# Patient Record
Sex: MALE | Race: OTHER | NOT HISPANIC OR LATINO | URBAN - METROPOLITAN AREA
[De-identification: names, ages, dates, MRNs, and addresses within clinical notes are randomized per-mention and may not be internally consistent; named-entity substitution may affect disease eponyms.]

---

## 2022-02-28 ENCOUNTER — INPATIENT (INPATIENT)
Facility: HOSPITAL | Age: 57
LOS: 1 days | Discharge: HOME | DRG: 247 | End: 2022-03-02
Attending: HOSPITALIST | Admitting: HOSPITALIST
Payer: MEDICAID

## 2022-02-28 VITALS
OXYGEN SATURATION: 98 % | SYSTOLIC BLOOD PRESSURE: 157 MMHG | WEIGHT: 253.53 LBS | DIASTOLIC BLOOD PRESSURE: 89 MMHG | HEART RATE: 88 BPM | HEIGHT: 72 IN | RESPIRATION RATE: 18 BRPM | TEMPERATURE: 98 F

## 2022-02-28 DIAGNOSIS — I21.4 NON-ST ELEVATION (NSTEMI) MYOCARDIAL INFARCTION: ICD-10-CM

## 2022-02-28 LAB
ALBUMIN SERPL ELPH-MCNC: 4.6 G/DL — SIGNIFICANT CHANGE UP (ref 3.3–5)
ALP SERPL-CCNC: 104 U/L — SIGNIFICANT CHANGE UP (ref 40–120)
ALT FLD-CCNC: 29 U/L — SIGNIFICANT CHANGE UP (ref 10–45)
ANION GAP SERPL CALC-SCNC: 14 MMOL/L — SIGNIFICANT CHANGE UP (ref 5–17)
APTT BLD: 34.4 SEC — SIGNIFICANT CHANGE UP (ref 27.5–35.5)
AST SERPL-CCNC: 51 U/L — HIGH (ref 10–40)
BASOPHILS # BLD AUTO: 0.06 K/UL — SIGNIFICANT CHANGE UP (ref 0–0.2)
BASOPHILS NFR BLD AUTO: 0.6 % — SIGNIFICANT CHANGE UP (ref 0–2)
BILIRUB SERPL-MCNC: 0.4 MG/DL — SIGNIFICANT CHANGE UP (ref 0.2–1.2)
BUN SERPL-MCNC: 17 MG/DL — SIGNIFICANT CHANGE UP (ref 7–23)
CALCIUM SERPL-MCNC: 10.2 MG/DL — SIGNIFICANT CHANGE UP (ref 8.4–10.5)
CHLORIDE SERPL-SCNC: 101 MMOL/L — SIGNIFICANT CHANGE UP (ref 96–108)
CO2 SERPL-SCNC: 24 MMOL/L — SIGNIFICANT CHANGE UP (ref 22–31)
CREAT SERPL-MCNC: 0.97 MG/DL — SIGNIFICANT CHANGE UP (ref 0.5–1.3)
EGFR: 91 ML/MIN/1.73M2 — SIGNIFICANT CHANGE UP
EOSINOPHIL # BLD AUTO: 0.33 K/UL — SIGNIFICANT CHANGE UP (ref 0–0.5)
EOSINOPHIL NFR BLD AUTO: 3.3 % — SIGNIFICANT CHANGE UP (ref 0–6)
GLUCOSE SERPL-MCNC: 109 MG/DL — HIGH (ref 70–99)
HCT VFR BLD CALC: 47 % — SIGNIFICANT CHANGE UP (ref 39–50)
HGB BLD-MCNC: 15.5 G/DL — SIGNIFICANT CHANGE UP (ref 13–17)
IMM GRANULOCYTES NFR BLD AUTO: 0.2 % — SIGNIFICANT CHANGE UP (ref 0–1.5)
INR BLD: 1 RATIO — SIGNIFICANT CHANGE UP (ref 0.88–1.16)
LYMPHOCYTES # BLD AUTO: 2.98 K/UL — SIGNIFICANT CHANGE UP (ref 1–3.3)
LYMPHOCYTES # BLD AUTO: 29.6 % — SIGNIFICANT CHANGE UP (ref 13–44)
MCHC RBC-ENTMCNC: 28.3 PG — SIGNIFICANT CHANGE UP (ref 27–34)
MCHC RBC-ENTMCNC: 33 GM/DL — SIGNIFICANT CHANGE UP (ref 32–36)
MCV RBC AUTO: 85.8 FL — SIGNIFICANT CHANGE UP (ref 80–100)
MONOCYTES # BLD AUTO: 0.75 K/UL — SIGNIFICANT CHANGE UP (ref 0–0.9)
MONOCYTES NFR BLD AUTO: 7.4 % — SIGNIFICANT CHANGE UP (ref 2–14)
NEUTROPHILS # BLD AUTO: 5.93 K/UL — SIGNIFICANT CHANGE UP (ref 1.8–7.4)
NEUTROPHILS NFR BLD AUTO: 58.9 % — SIGNIFICANT CHANGE UP (ref 43–77)
NRBC # BLD: 0 /100 WBCS — SIGNIFICANT CHANGE UP (ref 0–0)
PLATELET # BLD AUTO: 254 K/UL — SIGNIFICANT CHANGE UP (ref 150–400)
POTASSIUM SERPL-MCNC: 4.3 MMOL/L — SIGNIFICANT CHANGE UP (ref 3.5–5.3)
POTASSIUM SERPL-SCNC: 4.3 MMOL/L — SIGNIFICANT CHANGE UP (ref 3.5–5.3)
PROT SERPL-MCNC: 7.8 G/DL — SIGNIFICANT CHANGE UP (ref 6–8.3)
PROTHROM AB SERPL-ACNC: 12 SEC — SIGNIFICANT CHANGE UP (ref 10.5–13.4)
RBC # BLD: 5.48 M/UL — SIGNIFICANT CHANGE UP (ref 4.2–5.8)
RBC # FLD: 13.8 % — SIGNIFICANT CHANGE UP (ref 10.3–14.5)
SARS-COV-2 RNA SPEC QL NAA+PROBE: SIGNIFICANT CHANGE UP
SODIUM SERPL-SCNC: 139 MMOL/L — SIGNIFICANT CHANGE UP (ref 135–145)
TROPONIN T, HIGH SENSITIVITY RESULT: 693 NG/L — HIGH (ref 0–51)
WBC # BLD: 10.07 K/UL — SIGNIFICANT CHANGE UP (ref 3.8–10.5)
WBC # FLD AUTO: 10.07 K/UL — SIGNIFICANT CHANGE UP (ref 3.8–10.5)

## 2022-02-28 PROCEDURE — 93010 ELECTROCARDIOGRAM REPORT: CPT

## 2022-02-28 PROCEDURE — 93308 TTE F-UP OR LMTD: CPT | Mod: 26

## 2022-02-28 PROCEDURE — 71045 X-RAY EXAM CHEST 1 VIEW: CPT | Mod: 26

## 2022-02-28 PROCEDURE — 99291 CRITICAL CARE FIRST HOUR: CPT

## 2022-02-28 PROCEDURE — 99223 1ST HOSP IP/OBS HIGH 75: CPT

## 2022-02-28 RX ORDER — TICAGRELOR 90 MG/1
180 TABLET ORAL ONCE
Refills: 0 | Status: COMPLETED | OUTPATIENT
Start: 2022-02-28 | End: 2022-02-28

## 2022-02-28 RX ORDER — HEPARIN SODIUM 5000 [USP'U]/ML
INJECTION INTRAVENOUS; SUBCUTANEOUS
Qty: 25000 | Refills: 0 | Status: DISCONTINUED | OUTPATIENT
Start: 2022-02-28 | End: 2022-03-01

## 2022-02-28 RX ORDER — NITROGLYCERIN 6.5 MG
0.4 CAPSULE, EXTENDED RELEASE ORAL ONCE
Refills: 0 | Status: COMPLETED | OUTPATIENT
Start: 2022-02-28 | End: 2022-02-28

## 2022-02-28 RX ORDER — HEPARIN SODIUM 5000 [USP'U]/ML
6000 INJECTION INTRAVENOUS; SUBCUTANEOUS EVERY 6 HOURS
Refills: 0 | Status: DISCONTINUED | OUTPATIENT
Start: 2022-02-28 | End: 2022-03-01

## 2022-02-28 RX ORDER — HEPARIN SODIUM 5000 [USP'U]/ML
5000 INJECTION INTRAVENOUS; SUBCUTANEOUS ONCE
Refills: 0 | Status: COMPLETED | OUTPATIENT
Start: 2022-02-28 | End: 2022-02-28

## 2022-02-28 RX ORDER — ACETAMINOPHEN 500 MG
975 TABLET ORAL ONCE
Refills: 0 | Status: COMPLETED | OUTPATIENT
Start: 2022-02-28 | End: 2022-02-28

## 2022-02-28 RX ORDER — ASPIRIN/CALCIUM CARB/MAGNESIUM 324 MG
324 TABLET ORAL ONCE
Refills: 0 | Status: COMPLETED | OUTPATIENT
Start: 2022-02-28 | End: 2022-02-28

## 2022-02-28 RX ORDER — ATORVASTATIN CALCIUM 80 MG/1
80 TABLET, FILM COATED ORAL ONCE
Refills: 0 | Status: COMPLETED | OUTPATIENT
Start: 2022-02-28 | End: 2022-02-28

## 2022-02-28 RX ADMIN — Medication 975 MILLIGRAM(S): at 22:20

## 2022-02-28 RX ADMIN — HEPARIN SODIUM 5000 UNIT(S): 5000 INJECTION INTRAVENOUS; SUBCUTANEOUS at 23:23

## 2022-02-28 RX ADMIN — Medication 324 MILLIGRAM(S): at 22:20

## 2022-02-28 RX ADMIN — HEPARIN SODIUM 1000 UNIT(S)/HR: 5000 INJECTION INTRAVENOUS; SUBCUTANEOUS at 23:24

## 2022-02-28 RX ADMIN — TICAGRELOR 180 MILLIGRAM(S): 90 TABLET ORAL at 23:26

## 2022-02-28 RX ADMIN — Medication 0.4 MILLIGRAM(S): at 22:30

## 2022-02-28 NOTE — ED ADULT NURSE REASSESSMENT NOTE - NS ED NURSE REASSESS COMMENT FT1
Pt denies any CP relief after receiving PO tylenol and nitro SL per MD's order. RN made provider aware. VSS on RA. Pt on tele monitor. NAD. Will continue to closely monitor pt.

## 2022-02-28 NOTE — ED PROVIDER NOTE - PHYSICAL EXAMINATION
GENERAL: well appearing in no acute distress, non-toxic appearing  HEAD: normocephalic, atraumatic  HEENT: normal conjunctiva, oral mucosa moist, uvula midline, no tonsilar exudates, neck supple, no JVD  CARDIAC: regular rate and rhythm, normal S1S2, no appreciable murmurs, 2+ pulses in UE/LE b/l. Pain is reproducible w/ palpation.   PULM: normal breath sounds, clear to ascultation bilaterally, no rales, rhonchi, wheezing  GI: abdomen nondistended, soft, nontender, no guarding, rebound tenderness  : no CVA tenderness b/l, no suprapubic tenderness  NEURO: no focal motor or sensory deficits, CN2-12 intact, normal speech, PERRLA, EOMI, AAOx3  MSK: no peripheral edema, no calf tenderness b/l  SKIN: well-perfused, extremities warm, no visible rashes  PSYCH: appropriate mood and affect

## 2022-02-28 NOTE — ED ADULT NURSE NOTE - OBJECTIVE STATEMENT
56y Male PMHx appendectomy, primarily Swedish speaking but can make needs met in English, son at bedside for translation, presenting to ED for L sided CP radiating to L shoulder that began 2 days ago while at work. 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 Pt denies fever/chills/SOB/dyspnea. IV placed, labs drawn and sent, seen and eval by MD.

## 2022-02-28 NOTE — ED PROVIDER NOTE - OBJECTIVE STATEMENT
55 y/o male w/ PSH appendectomy w/o PMH c/o 2 day history left-side chest pain w/ radiation to left shoulder that began at work. Denies fevers, chills, numbness/tingling, dizziness, SOB, abdominal pain, dysuria, hematuria.

## 2022-02-28 NOTE — ED PROVIDER NOTE - ATTENDING CONTRIBUTION TO CARE
Attending MD Dumont:  I personally have seen and examined this patient. I have performed a substantive portion of the visit including all aspects of the medical decision making.  Resident note reviewed and agree on plan of care and except where noted.  See HPI, PE, and MDM for details.      55 y/o male, daily smoker, w/ PSH appendectomy w/o PMH c/o 2 day history left-side chest pain w/ radiation to left shoulder that began at work. Pain is constant but has spikes in intensity, left anterior with radiation to the left shoulder. No associated n/v or diaphoresis. Pain 8/10 on my interview. Patient states pain is slightly better with tylenol, worse with lying flat.         T(C): 36.8 (28 Feb 2022 18:25), Max: 36.8 (28 Feb 2022 18:25)  T(F): 98.2 (28 Feb 2022 18:25), Max: 98.2 (28 Feb 2022 18:25)  HR: 88 (28 Feb 2022 18:25) (88 - 88)  BP: 157/89 (28 Feb 2022 18:25) (157/89 - 157/89)  RR: 18 (28 Feb 2022 18:25) (18 - 18)  SpO2: 98% (28 Feb 2022 18:25) (98% - 98%)    Attending MD Dumont:    Gen:  No apparent distress, conversational  Neck: supple, no meningimus  CV: heart with reg rhythm, no obvious murmur appreciated. radial pulses 2+ bilaterally, upper and lower extremities warm to the touch. +ttp left anterior chest wall, no skin changes    Resp: clear to auscultation anteriorly bilaterally, breathing comfortably  Abd: soft, NT, ND  Extremities: ankles warm to the touch, no appreciable ankle edema bilaterally  Pysch: appropriate affect    Neuro: moves all extremities spontaneously         55 y/o male, daily smoker, w/ PSH appendectomy w/o PMH c/o 2 day history left-side chest pain w/ radiation to left shoulder that began at work. ECG on arrival with subtle inferior ST depression, question of very slight LEILANI aVL, though not quite meeting STEMI criteria. Repeat ECG in main ED with stable findings. Story is atypical for ACS, however ECG is concerning for possible occlusion MI. Plan for aspirin load, SL nitro, serial ECGs, send biomarkers. Do not suspect PE or dissection       *The above represents an initial assessment/impression. Please refer to progress notes for potential changes in patient clinical course*

## 2022-02-28 NOTE — ED PROVIDER NOTE - CARE PLAN
Principal Discharge DX:	Chest pain   1 Principal Discharge DX:	NSTEMI (non-ST elevation myocardial infarction)

## 2022-02-28 NOTE — ED PROVIDER NOTE - NS ED MD EM SELECTION
Medication would not load.    [facility]  Requesting a refill on Cholestyramine powder.   [address] Critical Care - 30 to 74 minutes

## 2022-02-28 NOTE — ED PROVIDER NOTE - CLINICAL SUMMARY MEDICAL DECISION MAKING FREE TEXT BOX
57 y/o male w/ PSH appendectomy w/o PMH c/o 2 day history left-side chest pain w/ radiation to left shoulder that began at work. Concerning for ACS vs pericarditis vs costocondritis.

## 2022-02-28 NOTE — ED PROVIDER NOTE - ST/T WAVE
stable appearing inferior ST depression, LEILANI aVL does not seem to be present anymore, TWI aVL present subtle ST depression lead II, III, aVF, 0.5mm LEILANI aVL with associated T wave inversion

## 2022-03-01 ENCOUNTER — TRANSCRIPTION ENCOUNTER (OUTPATIENT)
Age: 57
End: 2022-03-01

## 2022-03-01 DIAGNOSIS — I21.4 NON-ST ELEVATION (NSTEMI) MYOCARDIAL INFARCTION: ICD-10-CM

## 2022-03-01 DIAGNOSIS — Z29.9 ENCOUNTER FOR PROPHYLACTIC MEASURES, UNSPECIFIED: ICD-10-CM

## 2022-03-01 LAB
APTT BLD: 56.3 SEC — HIGH (ref 27.5–35.5)
CK MB CFR SERPL CALC: 20.1 NG/ML — HIGH (ref 0–6.7)
HCT VFR BLD CALC: 43.4 % — SIGNIFICANT CHANGE UP (ref 39–50)
HGB BLD-MCNC: 14.1 G/DL — SIGNIFICANT CHANGE UP (ref 13–17)
MCHC RBC-ENTMCNC: 27.9 PG — SIGNIFICANT CHANGE UP (ref 27–34)
MCHC RBC-ENTMCNC: 32.5 GM/DL — SIGNIFICANT CHANGE UP (ref 32–36)
MCV RBC AUTO: 85.9 FL — SIGNIFICANT CHANGE UP (ref 80–100)
NRBC # BLD: 0 /100 WBCS — SIGNIFICANT CHANGE UP (ref 0–0)
PLATELET # BLD AUTO: 242 K/UL — SIGNIFICANT CHANGE UP (ref 150–400)
RBC # BLD: 5.05 M/UL — SIGNIFICANT CHANGE UP (ref 4.2–5.8)
RBC # FLD: 13.7 % — SIGNIFICANT CHANGE UP (ref 10.3–14.5)
TROPONIN T, HIGH SENSITIVITY RESULT: 743 NG/L — HIGH (ref 0–51)
WBC # BLD: 8.7 K/UL — SIGNIFICANT CHANGE UP (ref 3.8–10.5)
WBC # FLD AUTO: 8.7 K/UL — SIGNIFICANT CHANGE UP (ref 3.8–10.5)

## 2022-03-01 PROCEDURE — 92928 PRQ TCAT PLMT NTRAC ST 1 LES: CPT | Mod: LD

## 2022-03-01 PROCEDURE — 99223 1ST HOSP IP/OBS HIGH 75: CPT

## 2022-03-01 PROCEDURE — 92929: CPT | Mod: LD

## 2022-03-01 PROCEDURE — 99233 SBSQ HOSP IP/OBS HIGH 50: CPT

## 2022-03-01 PROCEDURE — 99152 MOD SED SAME PHYS/QHP 5/>YRS: CPT

## 2022-03-01 PROCEDURE — 93458 L HRT ARTERY/VENTRICLE ANGIO: CPT | Mod: 26,59

## 2022-03-01 PROCEDURE — 93010 ELECTROCARDIOGRAM REPORT: CPT | Mod: 76

## 2022-03-01 PROCEDURE — 93306 TTE W/DOPPLER COMPLETE: CPT | Mod: 26

## 2022-03-01 RX ORDER — HEPARIN SODIUM 5000 [USP'U]/ML
INJECTION INTRAVENOUS; SUBCUTANEOUS
Qty: 25000 | Refills: 0 | Status: DISCONTINUED | OUTPATIENT
Start: 2022-03-01 | End: 2022-03-01

## 2022-03-01 RX ORDER — NITROGLYCERIN 6.5 MG
1 CAPSULE, EXTENDED RELEASE ORAL EVERY 8 HOURS
Refills: 0 | Status: DISCONTINUED | OUTPATIENT
Start: 2022-03-01 | End: 2022-03-02

## 2022-03-01 RX ORDER — NICOTINE POLACRILEX 2 MG
1 GUM BUCCAL DAILY
Refills: 0 | Status: DISCONTINUED | OUTPATIENT
Start: 2022-03-01 | End: 2022-03-02

## 2022-03-01 RX ORDER — FENTANYL CITRATE 50 UG/ML
25 INJECTION INTRAVENOUS ONCE
Refills: 0 | Status: DISCONTINUED | OUTPATIENT
Start: 2022-03-01 | End: 2022-03-01

## 2022-03-01 RX ORDER — ASPIRIN/CALCIUM CARB/MAGNESIUM 324 MG
325 TABLET ORAL DAILY
Refills: 0 | Status: DISCONTINUED | OUTPATIENT
Start: 2022-03-01 | End: 2022-03-01

## 2022-03-01 RX ORDER — ASPIRIN/CALCIUM CARB/MAGNESIUM 324 MG
81 TABLET ORAL DAILY
Refills: 0 | Status: DISCONTINUED | OUTPATIENT
Start: 2022-03-01 | End: 2022-03-02

## 2022-03-01 RX ORDER — TICAGRELOR 90 MG/1
90 TABLET ORAL EVERY 12 HOURS
Refills: 0 | Status: DISCONTINUED | OUTPATIENT
Start: 2022-03-01 | End: 2022-03-02

## 2022-03-01 RX ADMIN — ATORVASTATIN CALCIUM 80 MILLIGRAM(S): 80 TABLET, FILM COATED ORAL at 01:39

## 2022-03-01 RX ADMIN — Medication 1 INCH(S): at 21:10

## 2022-03-01 RX ADMIN — FENTANYL CITRATE 25 MICROGRAM(S): 50 INJECTION INTRAVENOUS at 17:36

## 2022-03-01 RX ADMIN — FENTANYL CITRATE 25 MICROGRAM(S): 50 INJECTION INTRAVENOUS at 16:44

## 2022-03-01 RX ADMIN — Medication 1 INCH(S): at 16:44

## 2022-03-01 RX ADMIN — TICAGRELOR 90 MILLIGRAM(S): 90 TABLET ORAL at 17:38

## 2022-03-01 RX ADMIN — HEPARIN SODIUM 1000 UNIT(S)/HR: 5000 INJECTION INTRAVENOUS; SUBCUTANEOUS at 06:37

## 2022-03-01 RX ADMIN — FENTANYL CITRATE 25 MICROGRAM(S): 50 INJECTION INTRAVENOUS at 16:52

## 2022-03-01 NOTE — ED ADULT NURSE REASSESSMENT NOTE - NS ED NURSE REASSESS COMMENT FT1
Pt asleep in stretcher but arouses to verbal stimuli. Pt a&ox4, spontaneous respirations and equal chest rise. VSS on RA. Tele monitoring continued. Pt offers no complaints. Will continue to closely monitor pt.

## 2022-03-01 NOTE — H&P ADULT - NSHPREVIEWOFSYSTEMS_GEN_ALL_CORE
CONSTITUTIONAL: No weakness, fevers or chills; no weight loss or weight gain  EYES: No visual changes; No blurry vision  ENT: No vertigo or throat pain   NECK: No pain or stiffness  RESPIRATORY: No cough, wheezing, hemoptysis; No shortness of breath  CARDIOVASCULAR: + chest pain , no palpitations, no RAE, no orthopnea or PND  GASTROINTESTINAL: No abdominal or epigastric pain. No nausea, vomiting, or hematemesis; No diarrhea or constipation. No melena or hematochezia.  GENITOURINARY: No dysuria, frequency or hematuria  NEUROLOGICAL: No numbness or weakness, no syncope  SKIN: No itching, rashes  PSYCH: No insomnia, no depression  IMMUNOLOGY: No gum bleeding, no lymphadenopathy  ENDOCRINE: No polydipsia, no heat or cold intolerance  RHEUM: No joint pain or swelling, no rash

## 2022-03-01 NOTE — CONSULT NOTE ADULT - SUBJECTIVE AND OBJECTIVE BOX
Patient seen and evaluated at bedside    Chief Complaint: CP    HPI:  56M no known PMH who presents with chest pain. CP started yesterday around 11am while moving boxes at work. Has been persistent, left sided, no radiation, no associated symptoms. Never had this sort of CP before. Patient is active smoker. Denies fever, SOB, abd pain, nausea, diaphoresis.     In the ED, /72, HR 70s. Exam unremarkable. Trop 693->743, CKMB 33->29. CXR clear. ECG with SR, no acute ischemic changes, no q waves. POCUS with extremely poor windows. When seen, patient was CP free after sublingual nitro.     PMHx:   Tobacco use    PSHx:   None    Allergies:  No Known Allergies      Home Meds: As per admission medication reconcilliation.     Current Medications:   atorvastatin 80 milliGRAM(s) Oral once  heparin   Injectable 6000 Unit(s) IV Push every 6 hours PRN  heparin  Infusion.  Unit(s)/Hr IV Continuous <Continuous>      FAMILY HISTORY: No early CVD.       Social History: Active smoking (1/2 pack for decades). No etoh.     REVIEW OF SYSTEMS:  Constitutional:     [x ] negative [ ] fevers [ ] chills [ ] weight loss [ ] weight gain  HEENT:                  [x ] negative [ ] dry eyes [ ] eye irritation [ ] postnasal drip [ ] nasal congestion  CV:                         [ x] negative  [ ] chest pain [ ] orthopnea [ ] palpitations [ ] murmur  Resp:                     [x ] negative [ ] cough [ ] shortness of breath [ ] dyspnea [ ] wheezing [ ] sputum [ ]hemoptysis  GI:                          [ x] negative [ ] nausea [ ] vomiting [ ] diarrhea [ ] constipation [ ] abd pain [ ] dysphagia   :                        [ x] negative [ ] dysuria [ ] nocturia [ ] hematuria [ ] increased urinary frequency  Musculoskeletal: [x ] negative [ ] back pain [ ] myalgias [ ] arthralgias [ ] fracture  Skin:                       [ x] negative [ ] rash [ ] itch  Neurological:        [ x] negative [ ] headache [ ] dizziness [ ] syncope [ ] weakness [ ] numbness  Psychiatric:           [ x] negative [ ] anxiety [ ] depression  Endocrine:            [ x] negative [ ] diabetes [ ] thyroid problem  Heme/Lymph:      [ x] negative [ ] anemia [ ] bleeding problem  Allergic/Immune: [ x] negative [ ] itchy eyes [ ] nasal discharge [ ] hives [ ] angioedema    [ x] All other systems negative  [ ] Unable to assess ROS due to      Physical Exam:  T(F): 98.2 (02-28), Max: 98.2 (02-28)  HR: 69 (02-28) (69 - 88)  BP: 112/70 (02-28) (112/70 - 157/89)  RR: 16 (02-28)  SpO2: 98% (02-28)  General: Alert, no acute distress, appears comfortable   HEENT: No scleral icterus, EOMI, no facial dysmorphia, no external ear lesions   Cardiac: Regular rate and rhythm, no murmurs, no rubs, no gallops   Pulmonary: Clear breath sounds throughout, no wheezing, no stridor, no crackles   Abdomen: Nondistended, nontender, appears soft   Skin: no obvious rash or lesions   Extremities: no LE edema  Neurological: Moving all 4 extremities, no overt focal deficits noted   Psych: normal mood and affect     Cardiovascular Diagnostic Testing:    ECG: Personally reviewed:  SR. No q waves.     Echo: Personally reviewed:  Pending.     Stress Testing:    Cath:    Imaging:    CXR: Personally reviewed    Labs: Personally reviewed                        15.5   10.07 )-----------( 254      ( 28 Feb 2022 22:16 )             47.0     02-28    139  |  101  |  17  ----------------------------<  109<H>  4.3   |  24  |  0.97    Ca    10.2      28 Feb 2022 22:16    TPro  7.8  /  Alb  4.6  /  TBili  0.4  /  DBili  x   /  AST  51<H>  /  ALT  29  /  AlkPhos  104  02-28    PT/INR - ( 28 Feb 2022 22:16 )   PT: 12.0 sec;   INR: 1.00 ratio         PTT - ( 28 Feb 2022 22:16 )  PTT:34.4 sec

## 2022-03-01 NOTE — H&P ADULT - NSHPADDITIONALINFOADULT_GEN_ALL_CORE
[facility] Division of Hospital Medicine  Beatriz Locke MD  Pager (M-F, 8A-5P): [phone]  Other Times:  [phone]

## 2022-03-01 NOTE — ASU PATIENT PROFILE, ADULT - FALL HARM RISK - UNIVERSAL INTERVENTIONS
Bed in lowest position, wheels locked, appropriate side rails in place/Call bell, personal items and telephone in reach/Instruct patient to call for assistance before getting out of bed or chair/Non-slip footwear when patient is out of bed/[place] to call system/Physically safe environment - no spills, clutter or unnecessary equipment/Purposeful Proactive Rounding/Room/bathroom lighting operational, light cord in reach

## 2022-03-01 NOTE — H&P ADULT - ASSESSMENT
56M no known PMH who presents with chest pain, found to have elevated trops, admitted for NSTEMI, pending cath.

## 2022-03-01 NOTE — CONSULT NOTE ADULT - ASSESSMENT
56M no known PMH who presents with chest pain since yesterday at 11am, found to have elevated troponin, and now admitted for NSTEMI.     #NSTEMI - CP since 11am yesterday, now resolved s/p sublingual nitro. Trop 693->743. CKMB 33->29. ECG no acute ischemic changes. KATHIE 2, Beatriz 89. S/p ASA&Brilinta load.     Recommendations:  -Resume ASA+Brilinta.  -Resume heparin gtt.   -Resume Atorvastatin 80mg.   -Continue Sublingual nitro PRN for CP. If bp soft, can use Morphine IVP 1-2mg instead.   -Obtain TSH, lipid profile, HgbA1c.   -Order formal TTE.   -Trend hsTroponin until peak.  -Plan for LHC in AM. NPO past MN except meds.   -If CP recuurs, repeat ECG and please reach out to me.    56M no known PMH who presents with chest pain since yesterday at 11am, found to have elevated troponin, and now admitted for NSTEMI.     #NSTEMI - CP since 11am on 12/27, now resolved s/p sublingual nitro. Trop 693->743. CKMB 33->29. ECG no acute ischemic changes. KATHIE 2, Beatriz 89. S/p ASA&Brilinta load.     Recommendations:  -Resume ASA+Brilinta.  -Resume heparin gtt.   -Resume Atorvastatin 80mg.   -Continue Sublingual nitro PRN for CP. If bp soft, can use Morphine IVP 1-2mg instead.   -Obtain TSH, lipid profile, HgbA1c.   -Order formal TTE.   -Trend hsTroponin until peak.  -Can admit to telemetry.   -Plan for LHC in AM. NPO past MN except meds.   -If CP recuurs, repeat ECG and please reach out to me.

## 2022-03-01 NOTE — CHART NOTE - NSCHARTNOTEFT_GEN_A_CORE
c/o left side chest pain 7-8/10 with no associated sx,   denies SOB, palpitation VSS /78, HR 70's RRA site benign.   EKG showed no significant ST abnormality compare to post PCI EKG  O2, NTP 1 inch applied, pain is improving to 4-5/10, pt reports he is feeling better    Continue to monitor   NTP 1" Q 8 hours (as per Dr. Huizar)  Continue ASA, Plavix, statin c/o left side chest pain 7-8/10 with no associated sx,   denies SOB, palpitation VSS /78, HR 70's RRA site benign.   EKG showed no significant ST abnormality compare to post PCI EKG  O2, NTP 1 inch applied, pain is improving to 4-5/10, pt reports he is feeling better    Continue to monitor   NTP 1" Q 8 hours (as per Dr. Huizar)  Continue ASA, Plavix, statin,     Another EKG done for lingering chest pain 4-5/10, no significant ST changes, VSS  Fentanyl 25mcg IVP given c/o left side chest pain 7-8/10 with no associated sx,   denies SOB, palpitation VSS /78, HR 70's RRA site benign.   EKG showed no significant ST abnormality compare to post PCI EKG  O2, NTP 1 inch applied, pain is improving to 4-5/10, pt reports he is feeling better    Continue to monitor   NTP 1" Q 8 hours (as per Dr. Huizar)  Continue ASA, Plavix, statin,     Another EKG done for lingering chest pain 4-5/10, no significant ST changes, VSS  Fentanyl 25mcg IVP x2 given    Then Pt was asleep for about 40 minutes and claims that same level of pain  Made Dr. Perez aware.

## 2022-03-01 NOTE — H&P ADULT - HISTORY OF PRESENT ILLNESS
You can access the [facility] Patient Portal, offered by [facility], by registering with the following website: [email or web address]
56M no known PMH who presents with chest pain x 1 day. Pt reports chest pain started yesterday around 11am while moving boxes at work, and since has been persistent, left sided, non radiating. Denies prior similar hx or fever, SOB, abd pain, nausea, diaphoresis. Pt is an active smoker.    In the ED, he is hemodynamically stable. Labwork significant for elevated trop 693->743, CKMB 33->29. ECG with NSR, no acute ischemic changes, no q waves. Received SL nitro with resolution of symptoms. ASA loaded, started on hep gtt. Planned for cath today.

## 2022-03-01 NOTE — H&P ADULT - PROBLEM SELECTOR PLAN 1
Elevated trop, CK-MB  S/p ASA and brillinta load; continue  Appreciate cardiology recs  Cont hep gtt X 48 hrs  F/u TTE  Planned for PCI today  Trend trop; f/u TSH, lipid panel, A1C  Monitor on tele

## 2022-03-01 NOTE — H&P ADULT - NSHPPHYSICALEXAM_GEN_ALL_CORE
Vital Signs Last 24 Hrs  T(C): 36.4 (01 Mar 2022 10:55), Max: 37.1 (01 Mar 2022 04:54)  T(F): 97.6 (01 Mar 2022 10:55), Max: 98.7 (01 Mar 2022 04:54)  HR: 79 (01 Mar 2022 11:40) (65 - 88)  BP: 125/94 (01 Mar 2022 11:40) (111/70 - 157/89)  BP(mean): 101 (01 Mar 2022 11:40) (95 - 101)  RR: 16 (01 Mar 2022 11:40) (16 - 18)  SpO2: 97% (01 Mar 2022 11:40) (94% - 100%)    GENERAL: Well appearing, in NAD  EYES: EOMI, conjunctiva and sclera clear  ENT: moist mucosa, no pharyngeal erythema  NECK: supple, no JVD  LUNG: Clear to auscultation bilaterally; No rales, rhonchi, wheezing, or rubs.   CVS: Regular rate and rhythm; No murmurs, rubs, or gallops  ABDOMEN: Soft, non tender, nondistended. +Bowel sounds.  EXTREMITIES:  no edema, no cyanosis  NEURO:  Alert & Oriented X3,  No focal deficits  PSYCH: Normal affect, normal mood  MUSCULOSKELETAL: FROM, no joint swelling  Skin: warm and dry, normal color

## 2022-03-02 ENCOUNTER — TRANSCRIPTION ENCOUNTER (OUTPATIENT)
Age: 57
End: 2022-03-02

## 2022-03-02 VITALS
RESPIRATION RATE: 17 BRPM | HEART RATE: 77 BPM | SYSTOLIC BLOOD PRESSURE: 125 MMHG | OXYGEN SATURATION: 96 % | TEMPERATURE: 98 F | DIASTOLIC BLOOD PRESSURE: 70 MMHG

## 2022-03-02 PROBLEM — Z78.9 OTHER SPECIFIED HEALTH STATUS: Chronic | Status: ACTIVE | Noted: 2022-03-01

## 2022-03-02 PROCEDURE — 71045 X-RAY EXAM CHEST 1 VIEW: CPT

## 2022-03-02 PROCEDURE — 80053 COMPREHEN METABOLIC PANEL: CPT

## 2022-03-02 PROCEDURE — 85027 COMPLETE CBC AUTOMATED: CPT

## 2022-03-02 PROCEDURE — C1769: CPT

## 2022-03-02 PROCEDURE — 85730 THROMBOPLASTIN TIME PARTIAL: CPT

## 2022-03-02 PROCEDURE — U0003: CPT

## 2022-03-02 PROCEDURE — C9600: CPT | Mod: LD

## 2022-03-02 PROCEDURE — 99239 HOSP IP/OBS DSCHRG MGMT >30: CPT

## 2022-03-02 PROCEDURE — 82553 CREATINE MB FRACTION: CPT

## 2022-03-02 PROCEDURE — 96374 THER/PROPH/DIAG INJ IV PUSH: CPT

## 2022-03-02 PROCEDURE — 93458 L HRT ARTERY/VENTRICLE ANGIO: CPT | Mod: 59

## 2022-03-02 PROCEDURE — 84484 ASSAY OF TROPONIN QUANT: CPT

## 2022-03-02 PROCEDURE — 99291 CRITICAL CARE FIRST HOUR: CPT | Mod: 25

## 2022-03-02 PROCEDURE — C1894: CPT

## 2022-03-02 PROCEDURE — 99233 SBSQ HOSP IP/OBS HIGH 50: CPT

## 2022-03-02 PROCEDURE — 36415 COLL VENOUS BLD VENIPUNCTURE: CPT

## 2022-03-02 PROCEDURE — 99153 MOD SED SAME PHYS/QHP EA: CPT

## 2022-03-02 PROCEDURE — 85025 COMPLETE CBC W/AUTO DIFF WBC: CPT

## 2022-03-02 PROCEDURE — 99152 MOD SED SAME PHYS/QHP 5/>YRS: CPT

## 2022-03-02 PROCEDURE — C8929: CPT

## 2022-03-02 PROCEDURE — C9601: CPT | Mod: LD

## 2022-03-02 PROCEDURE — C1725: CPT

## 2022-03-02 PROCEDURE — C1887: CPT

## 2022-03-02 PROCEDURE — C1874: CPT

## 2022-03-02 PROCEDURE — 93005 ELECTROCARDIOGRAM TRACING: CPT

## 2022-03-02 PROCEDURE — 93308 TTE F-UP OR LMTD: CPT

## 2022-03-02 PROCEDURE — 85610 PROTHROMBIN TIME: CPT

## 2022-03-02 RX ORDER — ATORVASTATIN CALCIUM 80 MG/1
40 TABLET, FILM COATED ORAL AT BEDTIME
Refills: 0 | Status: DISCONTINUED | OUTPATIENT
Start: 2022-03-02 | End: 2022-03-02

## 2022-03-02 RX ORDER — TICAGRELOR 90 MG/1
1 TABLET ORAL
Qty: 180 | Refills: 3
Start: 2022-03-02 | End: 2023-02-24

## 2022-03-02 RX ORDER — SIMVASTATIN 20 MG/1
1 TABLET, FILM COATED ORAL
Qty: 30 | Refills: 0
Start: 2022-03-02 | End: 2022-03-31

## 2022-03-02 RX ORDER — ATORVASTATIN CALCIUM 80 MG/1
1 TABLET, FILM COATED ORAL
Qty: 30 | Refills: 0
Start: 2022-03-02 | End: 2022-03-31

## 2022-03-02 RX ORDER — METOPROLOL TARTRATE 50 MG
25 TABLET ORAL DAILY
Refills: 0 | Status: DISCONTINUED | OUTPATIENT
Start: 2022-03-02 | End: 2022-03-02

## 2022-03-02 RX ORDER — TICAGRELOR 90 MG/1
1 TABLET ORAL
Qty: 60 | Refills: 0
Start: 2022-03-02 | End: 2022-03-31

## 2022-03-02 RX ORDER — SIMVASTATIN 20 MG/1
20 TABLET, FILM COATED ORAL AT BEDTIME
Refills: 0 | Status: DISCONTINUED | OUTPATIENT
Start: 2022-03-02 | End: 2022-03-02

## 2022-03-02 RX ORDER — ASPIRIN/CALCIUM CARB/MAGNESIUM 324 MG
1 TABLET ORAL
Qty: 0 | Refills: 0 | DISCHARGE
Start: 2022-03-02

## 2022-03-02 RX ORDER — LOSARTAN POTASSIUM 100 MG/1
1 TABLET, FILM COATED ORAL
Qty: 30 | Refills: 0
Start: 2022-03-02 | End: 2022-03-31

## 2022-03-02 RX ORDER — LOSARTAN POTASSIUM 100 MG/1
25 TABLET, FILM COATED ORAL DAILY
Refills: 0 | Status: DISCONTINUED | OUTPATIENT
Start: 2022-03-02 | End: 2022-03-02

## 2022-03-02 RX ORDER — METOPROLOL TARTRATE 50 MG
1 TABLET ORAL
Qty: 30 | Refills: 1
Start: 2022-03-02 | End: 2022-04-30

## 2022-03-02 RX ADMIN — Medication 1 INCH(S): at 09:49

## 2022-03-02 RX ADMIN — Medication 1 INCH(S): at 05:33

## 2022-03-02 RX ADMIN — Medication 1 INCH(S): at 04:32

## 2022-03-02 RX ADMIN — Medication 81 MILLIGRAM(S): at 05:33

## 2022-03-02 RX ADMIN — LOSARTAN POTASSIUM 25 MILLIGRAM(S): 100 TABLET, FILM COATED ORAL at 12:52

## 2022-03-02 RX ADMIN — TICAGRELOR 90 MILLIGRAM(S): 90 TABLET ORAL at 05:33

## 2022-03-02 RX ADMIN — Medication 25 MILLIGRAM(S): at 12:52

## 2022-03-02 NOTE — PROGRESS NOTE ADULT - NSPROGADDITIONALINFOA_GEN_ALL_CORE
.  Cindy Snyder MD  Division of Hospital Medicine  [facility]   Pager: [phone]    Medically stable for discharge home today 3/2/22 with outpatient f/u with Dr. Huizar on 3/23 @ 2:30pm.  Discharge planning time spent: 36 minutes    Plan discussed patient, son bedside, and [facility] NP Mirta.

## 2022-03-02 NOTE — DISCHARGE NOTE PROVIDER - NSDCCPTREATMENT_GEN_ALL_CORE_FT
PRINCIPAL PROCEDURE  Procedure: Placement of coronary artery stent  Findings and Treatment: POBA to D1, stent to the prox LAD

## 2022-03-02 NOTE — DISCHARGE NOTE PROVIDER - NSDCCPCAREPLAN_GEN_ALL_CORE_FT
PRINCIPAL DISCHARGE DIAGNOSIS  Diagnosis: CAD (coronary artery disease)  Assessment and Plan of Treatment: Do not stop you Aspirin or Brilinta unless instructed to do so by your cardiologist, they help keep your stented arteries open.   No heavy lifting or pushing/pulling with procedure arm for 2 weeks. No driving for 2 days. You may shower 24 hours following the procedure but avoid baths/swimming for 1 week. Check your wrist site for bleeding and/or swelling daily following procedure and call your doctor immediately if it occurs or if you experience increased pain at the site. Follow up with your cardiologist in 1-2 weeks. You may call [facility] if you have any questions/concerns regarding your procedure [phone].      SECONDARY DISCHARGE DIAGNOSES  Diagnosis: HTN (hypertension)  Assessment and Plan of Treatment: Continue with your blood pressure medications; eat a heart healthy diet with low salt diet; exercise regularly (consult with your physician or cardiologist first); maintain a heart healthy weight; if you smoke - quit (A resource to help you stop smoking is the [facility] [employer] for [employer] – phone number [phone].); include healthy ways to manage stress. Continue to follow with your primary care physician or cardiologist.    Diagnosis: HLD (hyperlipidemia)  Assessment and Plan of Treatment: Continue with your cholesterol medications. Eat a heart healthy diet that is low in saturated fats and salt, and includes whole grains, fruits, vegetables and lean protein; exercise regularly (consult with your physician or cardiologist first); maintain a heart healthy weight; if you smoke - quit (A resource to help you stop smoking is the [facility] for [facility] Control – phone number [phone].). Continue to follow with your primary physician or cardiologist.

## 2022-03-02 NOTE — PROGRESS NOTE ADULT - SUBJECTIVE AND OBJECTIVE BOX
Patient seen and examined at bedside.    Overnight Events: No acute events     Review Of Systems: No chest pain, shortness of breath, or palpitations            Current Meds:  aspirin  chewable 81 milliGRAM(s) Oral daily  nicotine -  14 mG/24Hr(s) Patch 1 patch Transdermal daily  nitroglycerin    2% Ointment 1 Inch(s) Transdermal every 8 hours  simvastatin 20 milliGRAM(s) Oral at bedtime  ticagrelor 90 milliGRAM(s) Oral every 12 hours      Vitals:  T(F): 98.2 (03-02), Max: 98.2 (03-02)  HR: 82 (03-02) (70 - 82)  BP: 111/65 (03-02) (111/65 - 159/81)  RR: 17 (03-02)  SpO2: 98% (03-02)  I&O's Summary    01 Mar 2022 07:01  -  02 Mar 2022 07:00  --------------------------------------------------------  IN: 480 mL / OUT: 900 mL / NET: -420 mL        Physical Exam:  Appearance: No acute distress; well appearing  Eyes: PERRL, EOMI, pink conjunctiva  HEENT: Normal oral mucosa  Cardiovascular: RRR, S1, S2, no murmurs, rubs, or gallops; no edema; no JVD  Respiratory: Clear to auscultation bilaterally  Gastrointestinal: soft, non-tender, non-distended with normal bowel sounds  Musculoskeletal: No clubbing; no joint deformity   Neurologic: Non-focal  Lymphatic: No lymphadenopathy  Psychiatry: AAOx3, mood & affect appropriate  Skin: No rashes, ecchymoses, or cyanosis                          14.1   8.70  )-----------( 242      ( 01 Mar 2022 05:53 )             43.4     02-28    139  |  101  |  17  ----------------------------<  109<H>  4.3   |  24  |  0.97    Ca    10.2      28 Feb 2022 22:16    TPro  7.8  /  Alb  4.6  /  TBili  0.4  /  DBili  x   /  AST  51<H>  /  ALT  29  /  AlkPhos  104  02-28    PT/INR - ( 28 Feb 2022 22:16 )   PT: 12.0 sec;   INR: 1.00 ratio         PTT - ( 01 Mar 2022 05:53 )  PTT:56.3 sec  CARDIAC MARKERS ( 01 Mar 2022 05:53 )  x     / x     / x     / x     / x     / 20.1 ng/mL  CARDIAC MARKERS ( 28 Feb 2022 23:21 )  743 ng/L / x     / x     / x     / x     / 29.0 ng/mL  CARDIAC MARKERS ( 28 Feb 2022 22:16 )  693 ng/L / x     / x     / x     / x     / 33.3 ng/mL    
[facility] of [facility]  Cindy Snyder MD  Pager (M-F, 8A-5P): [phone]  Other Times:  [phone]      Patient is a 56y old  Male who presents with a chief complaint of Chest pain (02 Mar 2022 07:18)      SUBJECTIVE / OVERNIGHT EVENTS: no acute events overnight. feels well without complaints. chest pain resolved. no dyspnea. feels well without complaints.  ADDITIONAL REVIEW OF SYSTEMS:    MEDICATIONS  (STANDING):  aspirin  chewable 81 milliGRAM(s) Oral daily  losartan 25 milliGRAM(s) Oral daily  metoprolol succinate ER 25 milliGRAM(s) Oral daily  nicotine -  14 mG/24Hr(s) Patch 1 patch Transdermal daily  nitroglycerin    2% Ointment 1 Inch(s) Transdermal every 8 hours  simvastatin 20 milliGRAM(s) Oral at bedtime  ticagrelor 90 milliGRAM(s) Oral every 12 hours    MEDICATIONS  (PRN):      CAPILLARY BLOOD GLUCOSE        I&O's Summary    01 Mar 2022 07:01  -  02 Mar 2022 07:00  --------------------------------------------------------  IN: 480 mL / OUT: 900 mL / NET: -420 mL    02 Mar 2022 07:01  -  02 Mar 2022 12:58  --------------------------------------------------------  IN: 480 mL / OUT: 0 mL / NET: 480 mL        PHYSICAL EXAM:  Vital Signs Last 24 Hrs  T(C): 36.7 (02 Mar 2022 08:39), Max: 36.8 (02 Mar 2022 05:32)  T(F): 98.1 (02 Mar 2022 08:39), Max: 98.2 (02 Mar 2022 05:32)  HR: 77 (02 Mar 2022 08:39) (73 - 82)  BP: 107/57 (02 Mar 2022 08:39) (107/57 - 149/84)  BP(mean): 70 (02 Mar 2022 08:39) (70 - 96)  RR: 17 (02 Mar 2022 08:39) (17 - 17)  SpO2: 95% (02 Mar 2022 08:39) (95% - 99%)    CONSTITUTIONAL: NAD, well-developed, well-groomed  EYES: PERRLA; conjunctiva and sclera clear  ENMT: Moist oral mucosa, no pharyngeal injection or exudates; normal dentition  NECK: Supple, no palpable masses; no thyromegaly  RESPIRATORY: Normal respiratory effort; lungs are clear to auscultation bilaterally  CARDIOVASCULAR: Regular rate and rhythm, normal S1 and S2, no murmur/rub/gallop; No lower extremity edema; Peripheral pulses are 2+ bilaterally  ABDOMEN: Soft, Nondistended, Nontender to palpation, normoactive bowel sounds  MUSCULOSKELETAL:  No clubbing or cyanosis of digits; no joint swelling or tenderness to palpation  PSYCH: A+O to person, place, and time; affect appropriate  NEUROLOGY: CN 2-12 are intact and symmetric; no gross sensory deficits   SKIN: No rashes; no palpable lesions, +R radial site c/d/i with no erythema nor ecchymoses    LABS:                        14.1   8.70  )-----------( 242      ( 01 Mar 2022 05:53 )             43.4     02-28    139  |  101  |  17  ----------------------------<  109<H>  4.3   |  24  |  0.97    Ca    10.2      28 Feb 2022 22:16    TPro  7.8  /  Alb  4.6  /  TBili  0.4  /  DBili  x   /  AST  51<H>  /  ALT  29  /  AlkPhos  104  02-28    PT/INR - ( 28 Feb 2022 22:16 )   PT: 12.0 sec;   INR: 1.00 ratio         PTT - ( 01 Mar 2022 05:53 )  PTT:56.3 sec  CARDIAC MARKERS ( 01 Mar 2022 05:53 )  x     / x     / x     / x     / 20.1 ng/mL  CARDIAC MARKERS ( 28 Feb 2022 23:21 )  x     / x     / x     / x     / 29.0 ng/mL  CARDIAC MARKERS ( 28 Feb 2022 22:16 )  x     / x     / x     / x     / 33.3 ng/mL      RADIOLOGY & ADDITIONAL TESTS:  Results Reviewed:   Imaging Personally Reviewed:  3/1/22 [facility]:  Diagnostic Findings:     Coronary Angiography   LM   Left main artery: The segment is visually normal in size and  structure.    LAD   First diagonal: Thrombosis is present. There is a 100 % stenosis.  Proximal left anterior descending: There is a 90 % stenosis.    Patient: LEVI AQUINO        MRN: [id number]  Study Date: 03/01/2022   09:49 AM      Page 1 of 6          CX   Circumflex: The segment is visually normal in size and structure.      RCA   Mid right coronary artery: There is a 50 % stenosis.      Ramus   Ramus intermedius: Angiography shows minor irregularities.      Left Heart Cath   The anterolateral segment is hypokinetic. Ejection fraction is 45%.        3/2/22 TTE:  Conclusions:  1. Normal mitral valve. Minimal mitral regurgitation.  2. Normal trileaflet aortic valve. No aortic valve  regurgitation seen.  3. Endocardial visualization enhanced with intravenous  injection of Ultrasonic Enhancing Agent (Definity). Mild  segmental left ventricular systolic dysfunction. No left  ventricular thrombus. The apical inferior wall, the apical  anterior wall, the apical septum, the apical lateral wall,  and the apical cap are hypokinetic.  4. Mild diastolic dysfunction (Stage I).  5. Normal right ventricular size and function.  Electrocardiogram Personally Reviewed:    COORDINATION OF CARE:  Care Discussed with Consultants/Other Providers [Y]: [facility] AMINA Kirby  Prior or Outpatient Records Reviewed [Y]: Cardiology progress note

## 2022-03-02 NOTE — PROGRESS NOTE ADULT - ASSESSMENT
56M no known PMH who presents with chest pain, found to have elevated troponin, and now admitted for NSTEMI s/p C revealing severe Diag and LAD disease s/p POBA in Diag, PCI to LAD via RRA.     - continue aspirin 81mg, ticagrelor 90mg bid   - transition simvastatin to atorva 40mg   - Obtain TSH, lipid profile, HgbA1c  - awaiting TTE   - start Toprol 25mg daily   - no cath site complications   - pending TTE result will consider discharge later today, follow up with Dr Huizar within 1-2 weeks    56M no known PMH who presents with chest pain, found to have elevated troponin, and now admitted for NSTEMI s/p C revealing severe Diag and LAD disease s/p POBA in Diag, PCI to LAD via RRA.     - continue aspirin 81mg, ticagrelor 90mg bid   - transition simvastatin to atorva 40mg   - Obtain TSH, lipid profile, HgbA1c  - awaiting TTE   - start Toprol 25mg daily   - no cath site complications   - pending TTE result will consider discharge later today, follow up with Dr Huizar 3/23 at 230   56M no known PMH who presents with chest pain, found to have elevated troponin, and now admitted for NSTEMI s/p C revealing severe Diag and LAD disease s/p POBA in Diag, PCI to LAD via RRA.     - continue aspirin 81mg, ticagrelor 90mg bid   - transition simvastatin to atorva 40mg   - Obtain TSH, lipid profile, HgbA1c  - awaiting TTE read, appears mild segmental LV dysfunction  - start Toprol 25mg daily as well as losartan 25mg   - no cath site complications   - discharge today, follow up with Dr Huizar 3/23 at 230

## 2022-03-02 NOTE — DISCHARGE NOTE PROVIDER - NSDCMRMEDTOKEN_GEN_ALL_CORE_FT
aspirin 81 mg oral tablet, chewable: 1 tab(s) orally once a day  ticagrelor 90 mg oral tablet: 1 tab(s) orally every 12 hours   aspirin 81 mg oral tablet, chewable: 1 tab(s) orally once a day  simvastatin 20 mg oral tablet: 1 tab(s) orally once a day (at bedtime)  ticagrelor 90 mg oral tablet: 1 tab(s) orally every 12 hours   aspirin 81 mg oral tablet, chewable: 1 tab(s) orally once a day  atorvastatin 40 mg oral tablet: 1 tab(s) orally once a day (at bedtime)  losartan 25 mg oral tablet: 1 tab(s) orally once a day  metoprolol succinate 25 mg oral tablet, extended release: 1 tab(s) orally once a day  ticagrelor 90 mg oral tablet: 1 tab(s) orally every 12 hours

## 2022-03-02 NOTE — PROGRESS NOTE ADULT - ASSESSMENT
55 yo male with no known PMH who p/w chest pain admitted for NSTEMI now s/p LHC revealing severe diag and LAD disease s/p POBA in diag, PCI to LAD via RRA

## 2022-03-02 NOTE — DISCHARGE NOTE NURSING/CASE MANAGEMENT/SOCIAL WORK - PATIENT PORTAL LINK FT
You can access the FollowMyHealth Patient Portal offered by [facility] by registering at the following website: [email or web address]. By joining [email or web address]’s FollowMyHealth portal, you will also be able to view your health information using other applications (apps) compatible with our system.

## 2022-03-02 NOTE — DISCHARGE NOTE PROVIDER - HOSPITAL COURSE
HPI:  56M no known PMH who presents with chest pain x 1 day. Pt reports chest pain started yesterday around 11am while moving boxes at work, and since has been persistent, left sided, non radiating. Denies prior similar hx or fever, SOB, abd pain, nausea, diaphoresis. Pt is an active smoker.    In the ED, he is hemodynamically stable. Labwork significant for elevated trop 693->743, CKMB 33->29. ECG with NSR, no acute ischemic changes, no q waves. Received SL nitro with resolution of symptoms. ASA loaded, started on hep gtt. Planned for cath today. (01 Mar 2022 11:41).    3/1 cardiac cath with POBA to the D1, stent to the prox LAD. Right radial access site without swelling, bleeding.   HPI:  56M no known PMH who presents with chest pain x 1 day. Pt reports chest pain started yesterday around 11am while moving boxes at work, and since has been persistent, left sided, non radiating. Denies prior similar hx or fever, SOB, abd pain, nausea, diaphoresis. Pt is an active smoker.    In the ED, he is hemodynamically stable. Labwork significant for elevated trop 693->743, CKMB 33->29. ECG with NSR, no acute ischemic changes, no q waves. Received SL nitro with resolution of symptoms. ASA loaded, started on hep gtt. Planned for cath today. (01 Mar 2022 11:41).    3/1 cardiac cath with POBA to the D1, stent to the prox LAD. Right radial access site without swelling, bleeding.  3/2: s/p TTE result evaluated by Dr. Huizar and advised vto d/c home.

## 2022-03-02 NOTE — DISCHARGE NOTE NURSING/CASE MANAGEMENT/SOCIAL WORK - NSDCPEFALRISK_GEN_ALL_CORE
For information on Fall & Injury Prevention, visit: [email or web address] OR  [email or web address] OR  https://www.cdc.gov/steadi/patient.html

## 2022-03-02 NOTE — DISCHARGE NOTE PROVIDER - CARE PROVIDER_API CALL
Andrew Huizar (MD)  Cardiovascular Disease; Interventional Cardiology  [address] [place] [address]  Phone: [phone]  Fax: [phone]  Follow Up Time: 2 weeks

## 2022-03-02 NOTE — PROGRESS NOTE ADULT - PROBLEM SELECTOR PLAN 1
s/p LHC revealing severe diag and LAD disease s/p POBA in diag, PCI to LAD via RRA  - c/w DAPT with ASA and Brillinta  - change simvastatin 20mg to atorva 40mg qHS  - add metopolol XL 25mg daily  - add losartan 25mg daily   - TTE results noted as above  - Cardiology follow-up appreciated  - patient to f/u with Dr. Huizar 3/23 at 2:30pm

## 2022-03-03 PROBLEM — Z00.00 ENCOUNTER FOR PREVENTIVE HEALTH EXAMINATION: Status: ACTIVE | Noted: 2022-03-03

## 2022-03-22 ENCOUNTER — RESULT CHARGE (OUTPATIENT)
Age: 57
End: 2022-03-22

## 2022-03-23 ENCOUNTER — APPOINTMENT (OUTPATIENT)
Dept: CARDIOLOGY | Facility: CLINIC | Age: 57
End: 2022-03-23
Payer: SELF-PAY

## 2022-03-23 ENCOUNTER — NON-APPOINTMENT (OUTPATIENT)
Age: 57
End: 2022-03-23

## 2022-03-23 VITALS
WEIGHT: 234 LBS | HEIGHT: 72 IN | HEART RATE: 66 BPM | BODY MASS INDEX: 31.69 KG/M2 | DIASTOLIC BLOOD PRESSURE: 79 MMHG | SYSTOLIC BLOOD PRESSURE: 147 MMHG | OXYGEN SATURATION: 99 %

## 2022-03-23 PROCEDURE — 93000 ELECTROCARDIOGRAM COMPLETE: CPT

## 2022-03-23 PROCEDURE — 99214 OFFICE O/P EST MOD 30 MIN: CPT

## 2022-03-23 RX ORDER — TICAGRELOR 90 MG/1
90 TABLET ORAL
Qty: 180 | Refills: 2 | Status: ACTIVE | COMMUNITY
Start: 2022-03-23

## 2022-03-24 NOTE — CARDIOLOGY SUMMARY
[de-identified] : 3/23/22\par Sinus  Rhythm \par -Left axis. \par  -  T-abnormality  - Anterolateral ischemia. \par \par ABNORMAL \par

## 2022-03-24 NOTE — DISCUSSION/SUMMARY
[FreeTextEntry1] : 57 y/o man with active smoking, Hchol, HTN, CAD - recent NSTEMI : LAD,diag PCI\par Doing well\par No CP\par \par CAD- no angina. C/w DAPT.\par Hcol- c/w current meds - return in june for repeat lipids\par Encouraged smoking cessation\par

## 2022-03-24 NOTE — HISTORY OF PRESENT ILLNESS
[FreeTextEntry1] : Recent LAD, diag stents for ACS\par returns for f/u\par Meds reconciled (was taking two statins)\par No CP\par No palps\par No LE edema\par No bleeding\par

## 2022-03-28 RX ORDER — ASPIRIN ENTERIC COATED TABLETS 81 MG 81 MG/1
81 TABLET, DELAYED RELEASE ORAL
Qty: 90 | Refills: 3 | Status: ACTIVE | COMMUNITY
Start: 2022-03-23 | End: 1900-01-01

## 2022-04-02 RX ORDER — TICAGRELOR 90 MG/1
1 TABLET ORAL
Qty: 180 | Refills: 3
Start: 2022-04-02 | End: 2023-03-27

## 2022-04-05 RX ORDER — SIMVASTATIN 20 MG/1
20 TABLET, FILM COATED ORAL DAILY
Refills: 0 | Status: DISCONTINUED | COMMUNITY
Start: 2022-03-23 | End: 2022-04-05

## 2022-04-05 RX ORDER — ATORVASTATIN CALCIUM 40 MG/1
40 TABLET, FILM COATED ORAL
Qty: 1 | Refills: 3 | Status: ACTIVE | COMMUNITY
Start: 2022-03-23 | End: 1900-01-01

## 2022-04-05 RX ORDER — METOPROLOL SUCCINATE 25 MG/1
25 TABLET, EXTENDED RELEASE ORAL DAILY
Qty: 90 | Refills: 3 | Status: ACTIVE | COMMUNITY
Start: 2022-03-23 | End: 1900-01-01

## 2022-04-05 RX ORDER — LOSARTAN POTASSIUM 25 MG/1
25 TABLET, FILM COATED ORAL DAILY
Qty: 1 | Refills: 3 | Status: ACTIVE | COMMUNITY
Start: 2022-03-23 | End: 1900-01-01

## 2022-04-21 NOTE — ED PROVIDER NOTE - PROGRESS NOTE DETAILS
Recommended artificial tears to use as directed. Attending MD Dumont: cardiology contacted for chest pain and elevated trop, concern NSTEMI. Attending MD Dumont: patient has pain score of 0 now. Plan for medical management of MI for now, no need for urgent cath at this time per cardiology. Will admit for NSTEMI to tele

## 2022-07-27 ENCOUNTER — NON-APPOINTMENT (OUTPATIENT)
Age: 57
End: 2022-07-27

## 2022-07-27 ENCOUNTER — APPOINTMENT (OUTPATIENT)
Dept: CARDIOLOGY | Facility: CLINIC | Age: 57
End: 2022-07-27

## 2022-07-27 VITALS
HEART RATE: 73 BPM | OXYGEN SATURATION: 99 % | SYSTOLIC BLOOD PRESSURE: 131 MMHG | DIASTOLIC BLOOD PRESSURE: 81 MMHG | HEIGHT: 72 IN

## 2022-07-27 DIAGNOSIS — I21.4 NON-ST ELEVATION (NSTEMI) MYOCARDIAL INFARCTION: ICD-10-CM

## 2022-07-27 DIAGNOSIS — Z86.39 PERSONAL HISTORY OF OTHER ENDOCRINE, NUTRITIONAL AND METABOLIC DISEASE: ICD-10-CM

## 2022-07-27 PROCEDURE — 93000 ELECTROCARDIOGRAM COMPLETE: CPT

## 2022-07-27 PROCEDURE — 99214 OFFICE O/P EST MOD 30 MIN: CPT | Mod: 25

## 2022-07-28 PROBLEM — Z86.39 HISTORY OF HYPERLIPIDEMIA: Status: ACTIVE | Noted: 2022-07-28

## 2022-07-28 PROBLEM — I21.4 NSTEMI, INITIAL EPISODE OF CARE: Status: ACTIVE | Noted: 2022-03-23

## 2022-07-28 LAB
ALBUMIN SERPL ELPH-MCNC: 4.4 G/DL
ALP BLD-CCNC: 111 U/L
ALT SERPL-CCNC: 18 U/L
ANION GAP SERPL CALC-SCNC: 10 MMOL/L
AST SERPL-CCNC: 17 U/L
BILIRUB SERPL-MCNC: 0.2 MG/DL
BUN SERPL-MCNC: 17 MG/DL
CALCIUM SERPL-MCNC: 9.3 MG/DL
CHLORIDE SERPL-SCNC: 103 MMOL/L
CHOLEST SERPL-MCNC: 131 MG/DL
CO2 SERPL-SCNC: 25 MMOL/L
CREAT SERPL-MCNC: 1.01 MG/DL
EGFR: 87 ML/MIN/1.73M2
ESTIMATED AVERAGE GLUCOSE: 157 MG/DL
GLUCOSE SERPL-MCNC: 85 MG/DL
HBA1C MFR BLD HPLC: 7.1 %
HDLC SERPL-MCNC: 38 MG/DL
LDLC SERPL CALC-MCNC: 61 MG/DL
NONHDLC SERPL-MCNC: 93 MG/DL
POTASSIUM SERPL-SCNC: 4.4 MMOL/L
PROT SERPL-MCNC: 6.9 G/DL
SODIUM SERPL-SCNC: 138 MMOL/L
TRIGL SERPL-MCNC: 161 MG/DL

## 2022-07-28 NOTE — DISCUSSION/SUMMARY
[EKG obtained to assist in diagnosis and management of assessed problem(s)] : EKG obtained to assist in diagnosis and management of assessed problem(s) [FreeTextEntry1] : 55 y/o man with active smoking, Hchol, HTN, CAD - recent NSTEMI : LAD,diag PCI\par Doing well\par No CP\par \par CAD- no angina. C/w DAPT.\par Hcol- c/w current meds - check labs\par  \par

## 2022-07-28 NOTE — HISTORY OF PRESENT ILLNESS
[FreeTextEntry1] : Recent LAD, diag stents for ACS\par returns for f/u \par No CP\par No palps\par No LE edema\par No bleeding\par

## 2022-07-28 NOTE — CARDIOLOGY SUMMARY
[de-identified] : 7/27/22\par Sinus  Rhythm \par -  Nonspecific T-abnormality. \par \par ABNORMAL

## 2024-06-06 NOTE — ED PROVIDER NOTE - EKG #1 DATE/TIME
We   prescription for cephalexin (Keflex) and if the redness, warmth and/or tenderness persists take as prescribed (4 times daily for 10 days).  Seek emergent reevaluation if you develop fever or worsening pain.  
28-Feb-2022 18:18